# Patient Record
Sex: MALE | Race: WHITE | NOT HISPANIC OR LATINO
[De-identification: names, ages, dates, MRNs, and addresses within clinical notes are randomized per-mention and may not be internally consistent; named-entity substitution may affect disease eponyms.]

---

## 2021-12-29 ENCOUNTER — APPOINTMENT (OUTPATIENT)
Dept: ORTHOPEDIC SURGERY | Facility: CLINIC | Age: 51
End: 2021-12-29
Payer: MEDICAID

## 2021-12-29 VITALS
BODY MASS INDEX: 39.9 KG/M2 | DIASTOLIC BLOOD PRESSURE: 69 MMHG | WEIGHT: 285 LBS | HEART RATE: 66 BPM | SYSTOLIC BLOOD PRESSURE: 120 MMHG | HEIGHT: 71 IN

## 2021-12-29 PROCEDURE — 73564 X-RAY EXAM KNEE 4 OR MORE: CPT | Mod: RT

## 2021-12-29 PROCEDURE — 99203 OFFICE O/P NEW LOW 30 MIN: CPT

## 2021-12-29 RX ORDER — HYALURONATE SODIUM 20 MG/2 ML
20 SYRINGE (ML) INTRAARTICULAR
Qty: 1 | Refills: 0 | Status: ACTIVE | COMMUNITY
Start: 2021-12-29

## 2022-01-03 NOTE — DISCUSSION/SUMMARY
[de-identified] : 52 y/o male with right knee pain. \par \par Patient presents for evaluation of right knee pain.  Patient has been told that he has had meniscal issues in the past, and x-rays today showed degeneration of the medial compartment.  I discussed the treatment of degenerative arthritis with the patient at length today, as well as the chronic degenerative process and likely future progression of the disease. Pain may be related to the bony degenerative changes seen on XR imaging, or the associated degeneration to the soft tissues within the knee joint, including cartilage, meniscus, or ligamentous structures.  I described the spectrum of treatment options available including nonoperative modalities to total joint arthroplasty. Noninvasive and nonoperative treatment modalities include weight reduction, activity modification with low impact exercise, PRN use of acetaminophen or anti-inflammatory medication, natural anti-inflammatory supplements, glucosamine/chondroitin, and physical therapy (for strengthening and conditioning). More invasive treatments can include injection therapies; including cortisone, hyaluronic acid (visco-supplementation), or platelet-rich-plasma (PRP) injections. Definitive treatment could also include future total joint arthroplasty if symptoms persist and cause prolonged disability or interference with activities of daily living. \par \par The risks and benefits of each treatment option was discussed and all questions were answered. At this time recommendations are for conservative and symptomatic care as detailed above with impact loading activity restriction, low impact exercise and avoidance of strenuous activity. \par \par Other recommendations include OTC NSAIDs or acetaminophen (if tolerated/able), with application of ice to the knee 2-3x daily for 20 minutes or after periods of activity. \par \par Recommend trial of Visco supplementation injection therapy. We'll obtain preauthorization prior to injection. \par \par Followup: Once approved for HA injection therapy.

## 2022-01-03 NOTE — PHYSICAL EXAM
[de-identified] : Oriented to time, place, person\par Mood: Normal\par Affect: Normal\par Appearance: Healthy, well appearing, no acute distress.\par Gait: Normal\par Assistive Devices: None\par \par Right Knee Exam:\par \par Skin: Clean, dry, intact\par Inspection: Mild varus malalignment, no masses, no swelling, no effusion\par Pulses: 2+ DP/PT pulses \par ROM: 0-125 degrees of flexion. No pain with deep knee flexion/extension. Pain with valgus \par Tenderness: + MJLT. No LJLT. No pain over the patella facets. No pain to the quadriceps tendon. No pain to the patella tendon. No posterior knee tenderness.\par Stability: Stable to varus, valgus. Negative Lachman testing. Negative anterior drawer, negative posterior drawer.\par Strength: 5/5 Q/H/TA/GS/EHL, without atrophy\par Neuro: Intact to light touch throughout, DTRs normal\par Additional Tests: + Shashank's test, Negative patellar grind test  [de-identified] : The following radiographs were ordered and read by me during this patients visit. I reviewed each radiograph in detail with the patient and discussed the findings as highlighted below. \par \par 4 views of the right knee were obtained today, 12/29/2021, that show no acute fracture or dislocation. There is moderate medial, no lateral and mild patellofemoral degenerative changes seen. There is no significant malalignment. No significant other obvious osseous abnormality, otherwise unremarkable.

## 2022-01-03 NOTE — HISTORY OF PRESENT ILLNESS
[de-identified] : 51 year old male  presents today with right knee pain x 10 years. No injury reported. He recalls being told that he has torn meniscus treated with PT and cortisone injections which was minimally helpful. The pain has gotten worse in the past 2 months is constant worse with prolonged standing and pivoting. Naproxen is helpful for the past 2 weeks. Localizes pain to medial aspect of the knee. Reports occasional buckling and locking sensation. Denies catching, numbness or tingling. Patient states that he used to play football when he was younger. \par \par The patient's past medical history, past surgical history, medications and allergies were reviewed by me today with the patient and documented accordingly. In addition, the patient's family and social history, which were noncontributory to this visit, were reviewed also.

## 2022-01-03 NOTE — ADDENDUM
[FreeTextEntry1] : This note was written by Homa Mclean on 12/29/2021 acting solely as a scribe for Dr. Matthew Morales.\par \par All medical record entries made by the Scribe were at my, Dr. Matthew Morales, direction and personally dictated by me on 12/29/2021. I have personally reviewed the chart and agree that the record accurately reflects my personal performance of the history, physical exam, assessment and plan.

## 2022-01-11 ENCOUNTER — FORM ENCOUNTER (OUTPATIENT)
Age: 52
End: 2022-01-11

## 2022-01-24 ENCOUNTER — APPOINTMENT (OUTPATIENT)
Dept: UROLOGY | Facility: CLINIC | Age: 52
End: 2022-01-24
Payer: MEDICAID

## 2022-01-24 VITALS — DIASTOLIC BLOOD PRESSURE: 80 MMHG | RESPIRATION RATE: 16 BRPM | HEART RATE: 76 BPM | SYSTOLIC BLOOD PRESSURE: 138 MMHG

## 2022-01-24 DIAGNOSIS — Z78.9 OTHER SPECIFIED HEALTH STATUS: ICD-10-CM

## 2022-01-24 PROCEDURE — 99204 OFFICE O/P NEW MOD 45 MIN: CPT

## 2022-01-24 RX ORDER — NAPROXEN 500 MG/1
500 TABLET ORAL
Qty: 60 | Refills: 0 | Status: ACTIVE | COMMUNITY
Start: 2021-12-12

## 2022-01-24 RX ORDER — LIDOCAINE AND PRILOCAINE 25; 25 MG/G; MG/G
2.5-2.5 CREAM TOPICAL
Qty: 1 | Refills: 3 | Status: ACTIVE | COMMUNITY
Start: 2022-01-24 | End: 1900-01-01

## 2022-01-24 RX ORDER — KETOTIFEN FUMARATE 0.25 MG/ML
0.03 SOLUTION/ DROPS OPHTHALMIC
Qty: 5 | Refills: 0 | Status: ACTIVE | COMMUNITY
Start: 2021-12-12

## 2022-01-24 RX ORDER — FLUTICASONE PROPIONATE 50 UG/1
50 SPRAY, METERED NASAL
Qty: 16 | Refills: 0 | Status: ACTIVE | COMMUNITY
Start: 2021-12-12

## 2022-01-24 RX ORDER — ERGOCALCIFEROL 1.25 MG/1
1.25 MG CAPSULE, LIQUID FILLED ORAL
Qty: 4 | Refills: 0 | Status: ACTIVE | COMMUNITY
Start: 2021-12-12

## 2022-01-24 NOTE — LETTER BODY
[FreeTextEntry1] : Olga Chi MD \par [address], [address], \[place] [address]\par [phone]\par \par Dear Dr. Chi, \par \par Reason for Visit: Premature ejaculation. \par \par This is a 51 year-old English-speaking gentleman with premature ejaculation. Patient is referred for evaluation of his condition. The patient complains of premature ejaculation. His recent testosterone levels were slightly elevated, 699 ng/dL.   Patient denies any gross hematuria or dysuria or urinary incontinence. The patient denies any aggravating or relieving factors. The patient denies any interference of function. The patient is entirely asymptomatic. All other review of systems are negative. He has no cancer in his family medical history. He has no previous surgical history. Past medical history, family history and social history were inquired and were noncontributory to current condition. The patient does not use tobacco. He drinks alcohol. Medications and allergies were reviewed. He has no known allergies to medication. \par \par On examination, the patient is a healthy-appearing gentleman in no acute distress. He is alert and oriented follows commands. He has normal mood and affect. He is normocephalic. Oral no thrush. Neck is supple. Respirations are unlabored. His abdomen is soft and nontender. Liver is nonpalpable. Bladder is nonpalpable. No CVA tenderness. Neurologically he is grossly intact. No peripheral edema. Skin without gross abnormality. He has normal male external genitalia. Normal meatus. Bilateral testes are descended intrascrotally and normal to palpation. On rectal examination, there is normal sphincter tone. The prostate is clinically benign without focal induration or nodularity.\par \par His total testosterone was 699 ng/dL, which is slightly elevated. \par \par Assessment: Premature ejaculation. \par \par I counseled the patient. I discussed the various etiologies of his symptoms. Given his slightly elevated testosterone levels, I recommended the patient obtain a male hormone panel with testosterone, free testosterone, prolactin, and LH level.  He will also obtain PSA, BMP and urinalysis today to establish baselines. I recommended the patient begin a trial of Lidocaine cream to help avoid premature ejaculation. I discussed the potential side effects of the medication. I counseled the patient on its use and side effects. If the patient develops any side effects, the patient will discontinue the medication and contact me. I encouraged the patient to practice the Masters-Duane technique to help prevent premature ejaculation. Risks and alternatives were discussed. I answered the patient questions. The patient will follow-up as directed and will contact me with any questions or concerns. Thank you for the opportunity to participate in the care of this patient. I'll keep you updated on his progress.\par \par Plan: Lidocaine cream. Masters-Duane technique. PSA. BMP. Urinalysis. Male hormone panel. Follow up as directed.

## 2022-01-24 NOTE — HISTORY OF PRESENT ILLNESS
[FreeTextEntry1] : Please refer to [facility] Consult note \par \par new male pt \par premature ejaculation \par testosterone 699 \par repeat testosterone\par Masters Duane technique \par lidocaine cream

## 2022-01-24 NOTE — ADDENDUM
[FreeTextEntry1] : Entered by Eren Brakley, acting as scribe for Dr. Sohail Mehta.\par \par The documentation recorded by the scribe accurately reflects the service I personally performed and the decisions made by me.

## 2022-01-30 ENCOUNTER — FORM ENCOUNTER (OUTPATIENT)
Age: 52
End: 2022-01-30

## 2022-01-30 DIAGNOSIS — Z00.00 ENCOUNTER FOR GENERAL ADULT MEDICAL EXAMINATION W/OUT ABNORMAL FINDINGS: ICD-10-CM

## 2022-01-30 LAB
ANION GAP SERPL CALC-SCNC: 14 MMOL/L
BUN SERPL-MCNC: 22 MG/DL
CALCIUM SERPL-MCNC: 9.4 MG/DL
CHLORIDE SERPL-SCNC: 106 MMOL/L
CO2 SERPL-SCNC: 24 MMOL/L
CREAT SERPL-MCNC: 1.15 MG/DL
ESTRADIOL SERPL-MCNC: 35 PG/ML
GLUCOSE SERPL-MCNC: 99 MG/DL
LH SERPL-ACNC: 8.2 IU/L
POTASSIUM SERPL-SCNC: 4.7 MMOL/L
PROLACTIN SERPL-MCNC: 32.7 NG/ML
PSA FREE FLD-MCNC: 21 %
PSA FREE SERPL-MCNC: 0.26 NG/ML
PSA SERPL-MCNC: 1.2 NG/ML
SODIUM SERPL-SCNC: 144 MMOL/L
TESTOST FREE SERPL-MCNC: 12.9 PG/ML
TESTOST SERPL-MCNC: 799 NG/DL

## 2022-02-17 ENCOUNTER — APPOINTMENT (OUTPATIENT)
Dept: UROLOGY | Facility: CLINIC | Age: 52
End: 2022-02-17

## 2022-02-25 ENCOUNTER — OUTPATIENT (OUTPATIENT)
Dept: OUTPATIENT SERVICES | Facility: HOSPITAL | Age: 52
LOS: 1 days | End: 2022-02-25
Payer: MEDICAID

## 2022-02-25 ENCOUNTER — APPOINTMENT (OUTPATIENT)
Dept: MRI IMAGING | Facility: IMAGING CENTER | Age: 52
End: 2022-02-25
Payer: MEDICAID

## 2022-02-25 DIAGNOSIS — Z00.00 ENCOUNTER FOR GENERAL ADULT MEDICAL EXAMINATION WITHOUT ABNORMAL FINDINGS: ICD-10-CM

## 2022-02-25 PROCEDURE — 70553 MRI BRAIN STEM W/O & W/DYE: CPT | Mod: 26

## 2022-02-25 PROCEDURE — 70553 MRI BRAIN STEM W/O & W/DYE: CPT

## 2022-02-25 PROCEDURE — A9585: CPT

## 2022-03-08 ENCOUNTER — APPOINTMENT (OUTPATIENT)
Dept: PULMONOLOGY | Facility: CLINIC | Age: 52
End: 2022-03-08
Payer: MEDICAID

## 2022-03-08 ENCOUNTER — NON-APPOINTMENT (OUTPATIENT)
Age: 52
End: 2022-03-08

## 2022-03-08 VITALS
HEART RATE: 69 BPM | DIASTOLIC BLOOD PRESSURE: 70 MMHG | HEIGHT: 71 IN | WEIGHT: 285 LBS | SYSTOLIC BLOOD PRESSURE: 120 MMHG | RESPIRATION RATE: 17 BRPM | BODY MASS INDEX: 39.9 KG/M2 | TEMPERATURE: 97 F | OXYGEN SATURATION: 96 %

## 2022-03-08 DIAGNOSIS — Z87.891 PERSONAL HISTORY OF NICOTINE DEPENDENCE: ICD-10-CM

## 2022-03-08 DIAGNOSIS — Z82.5 FAMILY HISTORY OF ASTHMA AND OTHER CHRONIC LOWER RESPIRATORY DISEASES: ICD-10-CM

## 2022-03-08 DIAGNOSIS — Z72.89 OTHER PROBLEMS RELATED TO LIFESTYLE: ICD-10-CM

## 2022-03-08 DIAGNOSIS — Z87.39 PERSONAL HISTORY OF OTHER DISEASES OF THE MUSCULOSKELETAL SYSTEM AND CONNECTIVE TISSUE: ICD-10-CM

## 2022-03-08 DIAGNOSIS — A04.8 OTHER SPECIFIED BACTERIAL INTESTINAL INFECTIONS: ICD-10-CM

## 2022-03-08 DIAGNOSIS — Z83.3 FAMILY HISTORY OF DIABETES MELLITUS: ICD-10-CM

## 2022-03-08 DIAGNOSIS — M17.11 UNILATERAL PRIMARY OSTEOARTHRITIS, RIGHT KNEE: ICD-10-CM

## 2022-03-08 DIAGNOSIS — F52.4 PREMATURE EJACULATION: ICD-10-CM

## 2022-03-08 PROCEDURE — 95012 NITRIC OXIDE EXP GAS DETER: CPT

## 2022-03-08 PROCEDURE — 71046 X-RAY EXAM CHEST 2 VIEWS: CPT

## 2022-03-08 PROCEDURE — 94010 BREATHING CAPACITY TEST: CPT

## 2022-03-08 PROCEDURE — 99204 OFFICE O/P NEW MOD 45 MIN: CPT | Mod: 25

## 2022-03-08 PROCEDURE — 94618 PULMONARY STRESS TESTING: CPT

## 2022-03-08 RX ORDER — ALBUTEROL SULFATE 90 UG/1
108 (90 BASE) AEROSOL, METERED RESPIRATORY (INHALATION) EVERY 6 HOURS
Qty: 1 | Refills: 3 | Status: ACTIVE | COMMUNITY
Start: 2022-03-08 | End: 1900-01-01

## 2022-03-08 RX ORDER — OLOPATADINE HYDROCHLORIDE 665 UG/1
0.6 SPRAY, METERED NASAL
Qty: 3 | Refills: 1 | Status: ACTIVE | COMMUNITY
Start: 2022-03-08 | End: 1900-01-01

## 2022-03-08 RX ORDER — OMEPRAZOLE 20 MG/1
20 TABLET, DELAYED RELEASE ORAL
Refills: 0 | Status: ACTIVE | COMMUNITY

## 2022-03-08 RX ORDER — BUDESONIDE AND FORMOTEROL FUMARATE DIHYDRATE 160; 4.5 UG/1; UG/1
160-4.5 AEROSOL RESPIRATORY (INHALATION) TWICE DAILY
Qty: 3 | Refills: 1 | Status: ACTIVE | COMMUNITY
Start: 2022-03-08 | End: 1900-01-01

## 2022-03-08 NOTE — ADDENDUM
[FreeTextEntry1] : Documented by Luis Alberto Lerner acting as a scribe for Dr. Robert Keller on  (03/08/2022).\par \par All medical record entries made by the Scribe were at my, Dr. Robert Keller's, direction and personally dictated by me on  (03/08/2022). I have reviewed the chart and agree that the record accurately reflects my personal performance of the history, physical exam, assessment and plan. I have also personally directed, reviewed, and agree with the discharge instructions. \par

## 2022-03-08 NOTE — ASSESSMENT
[FreeTextEntry1] : Mr. HO is a 51 y.o  male with a history of osteoarthritis right knee, overweight, GERD, premature ejaculation, recently elevated prolactin level  who presents into the office for an initial pulmonary evaluation for SOB, ?MAUDE, ?asthma/allergies, GERD \par \par \par The patient's SOB is felt to be multifactorial:\par -poor mechanics of breathing\par -out of shape/overweight\par -Pulmonary\par     - Asthma \par     -Allergies\par     -GERD \par     -?MAUDE\par -Cardiac\par \par Problem 1: Likely Asthma (risk factors: allergies and reflux)\par -add Symbicort 160 2 inhalations BID \par -add Ventolin 2 puffs Q6H, pre-exercise as needed\par - Inhaler technique reviewed as well as oral hygiene technique reviewed with patient. Avoidance of cold air, extremes of temperature, rescue inhaler should be used before exercise. Order of medication reviewed with patient. Recommended use of a cool mist humidifier in the bedroom. \par - Asthma is believed to be caused by inherited (genetic) and environmental factor, but its exact cause is unknown. asthma may be triggered by allergens, lung infections, or irritants in the air. Asthma triggers are different for each person \par \par Problem 2: Allergy and Sinus \par -add Olopatadine 0.6% 1 sniff BID\par  -get Blood work to include: asthma panel, food IgE panel, IgE level, eosinophil level, vitamin D level\par -Environmental measures for allergies were encouraged including mattress and pillow cover, air purifier, and environmental controls. \par  \par \par Problem 3: GERD \par -add Omeprazole QAM 30 minutes after other medications and 30 minutes before morning meal.\par -Rule of 2s: avoid eating too much, eating too late, eating too spicy, eating two hours before bed.\par - Things to avoid including overeating, spicy foods, tight clothing, eating within two hours of bed, this list is not all inclusive.\par - For treatments of reflux, possible options discussed including diet control, H2 blockers, PPIs, as well as coating motility agents discussed as treatment options. Timing of meals and proximity of last meal to sleep were discussed. If symptoms persist, a formal gastrointestinal evaluation is needed. \par \par Problem 4: Rull- out MAUDE ( risk factor: elevated Mallampati class, neck size-19)\par -complete home sleep study \par -Sleep apnea is associated with adverse clinical consequences which can affect most organ systems. Cardiovascular disease risk includes arrhythmias, atrial fibrillation, hypertension, coronary artery disease, and stroke. Metabolic disorders include diabetes type 2, non-alcoholic fatty liver disease. Mood disorder especially depression; and cognitive decline especially in the elderly. Associations with chronic reflux/Grimes’s esophagus some but not all inclusive. \par -Reasons include arousal consistent with hypopnea; respiratory events most prominent in REM sleep or supine position; therefore sleep staging and body position are important for accurate diagnosis and estimation of AHI. \par  \par \par Problem 5: Low Vitamin D \par -Has been associated with asthma exacerbations and increased allergic symptoms. The goal based on recent information is maintaining levels between 50-70 and low normal is 30. Recommended 50,000 units every two weeks to once a month depending on the level. \par \par Problem 6:Cardiac\par -Recommend cardiac follow up evaluation with cardiologist if needed \par \par Problem 7:overweight/out of shape\par - Weight loss, exercise and diet control were discussed and are highly encouraged. Treatment options were given such as aqua therapy, and contacting a nutritionist. Recommended to use the elliptical, stationary bike, less use of treadmill. Mindful eating was explained to the patient. Obesity is associated with worsening asthma, SOB, and potential for cardiac disease, diabetes, and other underlying medical conditions.\par \par Problem 8: Poor mechanics of breathing\par - Proper breathing techniques were reviewed with an emphasis on exhalation. Patient instructed to breath in for 1 second and out for four seconds. Patient was encouraged not to talk while walking.\par \par Problem 9: Health Maintenance\par -s/p flu shot\par -recommended strep pneumonia vaccines: Prevnar-13 vaccine, follow by Pneumo vaccine 23 one year following\par -recommended early intervention for URIs\par -recommended regular osteoporosis evaluations\par -recommended early dermatological evaluations\par -recommended after the age of 50 to the age of 70, colonoscopy every 5 years\par \par f/u in 6-8 weeks\par pt is encouraged to call or fax the office with any questions or concerns.

## 2022-03-08 NOTE — PROCEDURE
[FreeTextEntry1] : PFT revealed mild restriction, with a FEV1 of 3.20L, which is 78% of predicted, with a poor inspiratory limb \par  \par Feno was 87; a normal value being less than 25. Fractional exhaled nitric oxide (FENO) is regarded as a simple, noninvasive method for assessing eosinophilic airway inflammation. Produced by a variety of cells within the lung, nitric oxide (NO) concentrations are generally low in healthy individuals. However, high concentrations of NO appear to be involved in nonspecific host defense mechanisms and chronic inflammatory  diseases such as asthma. The American Thoracic Society (ATS) therefore recommended using FENO to aid in the diagnosis and monitoring of eosinophilic airway inflammation and asthma, and for identifying steroid responsive individuals whose chronic respiratory symptoms may be caused by airway inflammation \par \par 6 minute walk test reveals a low saturation of 96% with no evidence of dyspnea or fatigue; walked 489 meters\par  \par \par CXR revealed a normal sized heart; there was no evidence of infiltrate or effusion -- A normal appearing chest radiograph \par \par Free and total testosterone (01/24/2022)- 799, prolactin level- 39

## 2022-03-08 NOTE — HISTORY OF PRESENT ILLNESS
[TextBox_4] : Mr. HO is a 51 year male with a history of osteoarthritis right knee, overweight, GERD, premature ejaculation, recently elevated prolactin level who now comes in for an initial pulmonary evaluation. His chief complaint is\par - he notes getting SOB occasionally now \par - he notes wheezing for last 2 years \par - he notes gaining 20 lbs over last 2 months \par - he notes cold air makes him feel better\par - he notes appetite is good\par - he notes snoring \par - he notes waking up atleast once to use restroom every night\par - he reports being able to fall asleep while watching a boring TV show \par -he reports being able to fall asleep as a passenger in a car for over an hour\par - he notes concentration and memory is good \par - he notes neck size is 19 \par -he notes his bowels are regular \par - he notes sense of smell and taste is good \par - he notes sinuses are quite\par - he notes having very  bad allergies \par - he notes allergy symptoms are sneezing, wheezing and PND (spring is worse)\par - he notes reflux \par - he notes muscle aches and pains \par \par patient denies any headaches, nausea, vomiting, fever, chills, sweats, chest pain, chest pressure, palpitations, coughing, fatigue, diarrhea, constipation, dysphagia, myalgias, dizziness, leg swelling, leg pain, itchy eyes, itchy ears, heartburn, or sour taste in the mouth

## 2022-03-13 ENCOUNTER — LABORATORY RESULT (OUTPATIENT)
Age: 52
End: 2022-03-13

## 2022-03-14 LAB
24R-OH-CALCIDIOL SERPL-MCNC: 73.3 PG/ML
25(OH)D3 SERPL-MCNC: 20.6 NG/ML
BASOPHILS # BLD AUTO: 0.04 K/UL
BASOPHILS NFR BLD AUTO: 0.7 %
EOSINOPHIL # BLD AUTO: 0.19 K/UL
EOSINOPHIL NFR BLD AUTO: 3.5 %
HCT VFR BLD CALC: 46.1 %
HGB BLD-MCNC: 15 G/DL
IMM GRANULOCYTES NFR BLD AUTO: 0.4 %
LYMPHOCYTES # BLD AUTO: 2.23 K/UL
LYMPHOCYTES NFR BLD AUTO: 41.4 %
MAN DIFF?: NORMAL
MCHC RBC-ENTMCNC: 32.5 GM/DL
MCHC RBC-ENTMCNC: 32.8 PG
MCV RBC AUTO: 100.9 FL
MONOCYTES # BLD AUTO: 0.63 K/UL
MONOCYTES NFR BLD AUTO: 11.7 %
NEUTROPHILS # BLD AUTO: 2.28 K/UL
NEUTROPHILS NFR BLD AUTO: 42.3 %
PLATELET # BLD AUTO: 218 K/UL
RBC # BLD: 4.57 M/UL
RBC # FLD: 13.1 %
WBC # FLD AUTO: 5.39 K/UL

## 2022-03-17 LAB
A ALTERNATA IGE QN: <0.1 KUA/L
A ALTERNATA IGE QN: <0.1 KUA/L
A FUMIGATUS IGE QN: <0.1 KUA/L
A FUMIGATUS IGE QN: <0.1 KUA/L
BERMUDA GRASS IGE QN: <0.1 KUA/L
BOXELDER IGE QN: 0.17 KUA/L
C ALBICANS IGE QN: <0.1 KUA/L
C HERBARUM IGE QN: <0.1 KUA/L
C HERBARUM IGE QN: <0.1 KUA/L
CALIF WALNUT IGE QN: <0.1 KUA/L
CAT DANDER IGE QN: 0.43 KUA/L
CAT DANDER IGE QN: 0.43 KUA/L
CLAM IGE QN: <0.1 KUA/L
CMN PIGWEED IGE QN: <0.1 KUA/L
CODFISH IGE QN: <0.1 KUA/L
COMMON RAGWEED IGE QN: 0.28 KUA/L
COMMON RAGWEED IGE QN: 0.28 KUA/L
CORN IGE QN: <0.1 KUA/L
COTTONWOOD IGE QN: <0.1 KUA/L
COW MILK IGE QN: <0.1 KUA/L
D FARINAE IGE QN: 0.19 KUA/L
D FARINAE IGE QN: 0.19 KUA/L
D PTERONYSS IGE QN: 0.12 KUA/L
D PTERONYSS IGE QN: 0.12 KUA/L
DEPRECATED A ALTERNATA IGE RAST QL: 0
DEPRECATED A ALTERNATA IGE RAST QL: 0
DEPRECATED A FUMIGATUS IGE RAST QL: 0
DEPRECATED A FUMIGATUS IGE RAST QL: 0
DEPRECATED BERMUDA GRASS IGE RAST QL: 0
DEPRECATED BOXELDER IGE RAST QL: NORMAL
DEPRECATED C ALBICANS IGE RAST QL: 0
DEPRECATED C HERBARUM IGE RAST QL: 0
DEPRECATED C HERBARUM IGE RAST QL: 0
DEPRECATED CAT DANDER IGE RAST QL: 1
DEPRECATED CAT DANDER IGE RAST QL: 1
DEPRECATED CLAM IGE RAST QL: 0
DEPRECATED CODFISH IGE RAST QL: 0
DEPRECATED COMMON PIGWEED IGE RAST QL: 0
DEPRECATED COMMON RAGWEED IGE RAST QL: NORMAL
DEPRECATED COMMON RAGWEED IGE RAST QL: NORMAL
DEPRECATED CORN IGE RAST QL: 0
DEPRECATED COTTONWOOD IGE RAST QL: 0
DEPRECATED COW MILK IGE RAST QL: 0
DEPRECATED D FARINAE IGE RAST QL: NORMAL
DEPRECATED D FARINAE IGE RAST QL: NORMAL
DEPRECATED D PTERONYSS IGE RAST QL: NORMAL
DEPRECATED D PTERONYSS IGE RAST QL: NORMAL
DEPRECATED DOG DANDER IGE RAST QL: 1
DEPRECATED DOG DANDER IGE RAST QL: 1
DEPRECATED DUCK FEATHER IGE RAST QL: 0
DEPRECATED EGG WHITE IGE RAST QL: 0
DEPRECATED GOOSE FEATHER IGE RAST QL: 0
DEPRECATED GOOSEFOOT IGE RAST QL: 0
DEPRECATED LONDON PLANE IGE RAST QL: 0
DEPRECATED M RACEMOSUS IGE RAST QL: 0
DEPRECATED MOUSE URINE PROT IGE RAST QL: NORMAL
DEPRECATED MUGWORT IGE RAST QL: 0
DEPRECATED P NOTATUM IGE RAST QL: 0
DEPRECATED PEANUT IGE RAST QL: 0
DEPRECATED RED CEDAR IGE RAST QL: 0
DEPRECATED ROACH IGE RAST QL: 2
DEPRECATED ROACH IGE RAST QL: 2
DEPRECATED SCALLOP IGE RAST QL: <0.1 KUA/L
DEPRECATED SESAME SEED IGE RAST QL: 0
DEPRECATED SHEEP SORREL IGE RAST QL: 0
DEPRECATED SHRIMP IGE RAST QL: 3
DEPRECATED SILVER BIRCH IGE RAST QL: 0
DEPRECATED SOYBEAN IGE RAST QL: 0
DEPRECATED TIMOTHY IGE RAST QL: 0
DEPRECATED TIMOTHY IGE RAST QL: 0
DEPRECATED WALNUT IGE RAST QL: 0
DEPRECATED WHEAT IGE RAST QL: 0
DEPRECATED WHITE ASH IGE RAST QL: 0
DEPRECATED WHITE OAK IGE RAST QL: 0
DEPRECATED WHITE OAK IGE RAST QL: 0
DOG DANDER IGE QN: 0.49 KUA/L
DOG DANDER IGE QN: 0.49 KUA/L
DUCK FEATHER IGE QN: <0.1 KUA/L
EGG WHITE IGE QN: <0.1 KUA/L
GOOSE FEATHER IGE QN: <0.1 KUA/L
GOOSEFOOT IGE QN: <0.1 KUA/L
LONDON PLANE IGE QN: <0.1 KUA/L
M RACEMOSUS IGE QN: <0.1 KUA/L
MOUSE URINE PROT IGE QN: 0.31 KUA/L
MUGWORT IGE QN: <0.1 KUA/L
MULBERRY (T70) CLASS: 0
MULBERRY (T70) CONC: <0.1 KUA/L
P NOTATUM IGE QN: <0.1 KUA/L
PEANUT IGE QN: <0.1 KUA/L
RED CEDAR IGE QN: <0.1 KUA/L
ROACH IGE QN: 1.88 KUA/L
ROACH IGE QN: 1.88 KUA/L
SCALLOP IGE QN: 0
SCALLOP IGE QN: 8.88 KUA/L
SESAME SEED IGE QN: <0.1 KUA/L
SHEEP SORREL IGE QN: <0.1 KUA/L
SILVER BIRCH IGE QN: <0.1 KUA/L
SOYBEAN IGE QN: <0.1 KUA/L
TIMOTHY IGE QN: <0.1 KUA/L
TIMOTHY IGE QN: <0.1 KUA/L
TOTAL IGE SMQN RAST: 199 KU/L
TREE ALLERG MIX1 IGE QL: 0
WALNUT IGE QN: <0.1 KUA/L
WHEAT IGE QN: <0.1 KUA/L
WHITE ASH IGE QN: <0.1 KUA/L
WHITE ELM IGE QN: 0
WHITE ELM IGE QN: <0.1 KUA/L
WHITE OAK IGE QN: <0.1 KUA/L
WHITE OAK IGE QN: <0.1 KUA/L

## 2022-03-24 ENCOUNTER — NON-APPOINTMENT (OUTPATIENT)
Age: 52
End: 2022-03-24

## 2022-04-25 ENCOUNTER — APPOINTMENT (OUTPATIENT)
Dept: ENDOCRINOLOGY | Facility: CLINIC | Age: 52
End: 2022-04-25
Payer: MEDICAID

## 2022-04-25 VITALS
BODY MASS INDEX: 42.98 KG/M2 | HEART RATE: 71 BPM | WEIGHT: 307 LBS | SYSTOLIC BLOOD PRESSURE: 130 MMHG | OXYGEN SATURATION: 95 % | HEIGHT: 71 IN | DIASTOLIC BLOOD PRESSURE: 78 MMHG

## 2022-04-25 DIAGNOSIS — R79.89 OTHER SPECIFIED ABNORMAL FINDINGS OF BLOOD CHEMISTRY: ICD-10-CM

## 2022-04-25 PROCEDURE — 99204 OFFICE O/P NEW MOD 45 MIN: CPT

## 2022-04-30 NOTE — HISTORY OF PRESENT ILLNESS
[FreeTextEntry1] : MADDI HO  is a 50 yo male  with past medical history of OA of right knee, GERD, premature ejaculation, who presents as referred by urology for management of elevated prolactin.\par \par Patient notes he was seen by urology in Jan 2022, noted to have mildly elevated prolactin level upon review of bloodwork. Noted also elevated testosterone levels. MRI pituitary in Feb 2022 was negative for any microadenoma. Patient notes weight gain of about 20lbs in last couple of months, notes lack of energy, feeling like he has hot flashes, sweating. Denies blurry or double vision, galactorrhea. Has decrease in libido and headaches. . \par Patient denies dyspnea, dysphagia, dysphonia, changes in bowel habits.\par \par Family Hx: Mom- DM, daughter- type 1 DM. No known h/o thyroid disease\par Social Hx: social ETOH, denies illicit drug use or tobacco\par ALL: probiotics

## 2022-04-30 NOTE — PHYSICAL EXAM
[Alert] : alert [Obese] : obese [No Acute Distress] : no acute distress [Well Developed] : well developed [Normal Sclera/Conjunctiva] : normal sclera/conjunctiva [EOMI] : extra ocular movement intact [No Proptosis] : no proptosis [No Lid Lag] : no lid lag [Normal Hearing] : hearing was normal [No LAD] : no lymphadenopathy [Supple] : the neck was supple [Thyroid Not Enlarged] : the thyroid was not enlarged [No Thyroid Nodules] : no palpable thyroid nodules [No Respiratory Distress] : no respiratory distress [No Accessory Muscle Use] : no accessory muscle use [Clear to Auscultation] : lungs were clear to auscultation bilaterally [Normal Rate and Effort] : normal respiratory rate and effort [Normal S1, S2] : normal S1 and S2 [No Murmurs] : no murmurs [Normal Rate] : heart rate was normal [Regular Rhythm] : with a regular rhythm [Normal Bowel Sounds] : normal bowel sounds [Not Tender] : non-tender [Soft] : abdomen soft [Normal Gait] : normal gait [No Clubbing, Cyanosis] : no clubbing  or cyanosis of the fingernails [No Involuntary Movements] : no involuntary movements were seen [Abdominal Striae] : abdominal striae present [Acanthosis Nigricans] : no acanthosis nigricans [No Tremors] : no tremors [Oriented x3] : oriented to person, place, and time [Normal Insight/Judgement] : insight and judgment were intact

## 2022-04-30 NOTE — ASSESSMENT
[FreeTextEntry1] : Patient presents for mildly elevated prolactin level, unclear etiology.\par Not currently on medications that may be be contributing to prolactin. Reviewed recent bloodwork, noted LH/FSH levels are normal, not indicative of hypogonadism. No TFTs to review.\par Noted MRI of pituitary is negative for pituitary adenoma.\par Explained different causes of elevated prolactin including emotional or physical stressors, prolactin levels may also mildly increase with meals, if not collected as fasting. Also discussed avoiding any form of nipple stimulation during sexual intercourse before checking serum prolactin levels with patient and his wife today.\par Advised to do all hormonal bloodwork as fasting, will check for TFTs and macroprolactin and follow up accordingly.\par \par Answered all questions today; patient and his wife verbalized understanding of the above\par RTC as needed.

## 2022-05-02 LAB
FSH SERPL-MCNC: 6.7 IU/L
LH SERPL-ACNC: 6.1 IU/L
T4 FREE SERPL-MCNC: 1 NG/DL
TSH SERPL-ACNC: 1.25 UIU/ML

## 2022-05-10 ENCOUNTER — APPOINTMENT (OUTPATIENT)
Dept: SLEEP CENTER | Facility: CLINIC | Age: 52
End: 2022-05-10

## 2022-05-11 LAB
TESTOST FREE SERPL-MCNC: 14 PG/ML
TESTOST SERPL-MCNC: 906 NG/DL

## 2022-05-12 LAB
MONOMERIC PROLACTIN (ICMA)*: 7.2 NG/ML
PERCENT MACROPROLACTIN: 78 %
PROLACTIN, SERUM (ICMA)*: 33.1 NG/ML

## 2022-05-13 ENCOUNTER — APPOINTMENT (OUTPATIENT)
Dept: PULMONOLOGY | Facility: CLINIC | Age: 52
End: 2022-05-13
Payer: MEDICAID

## 2022-05-13 ENCOUNTER — NON-APPOINTMENT (OUTPATIENT)
Age: 52
End: 2022-05-13

## 2022-05-13 DIAGNOSIS — J45.909 UNSPECIFIED ASTHMA, UNCOMPLICATED: ICD-10-CM

## 2022-05-13 DIAGNOSIS — R76.8 OTHER SPECIFIED ABNORMAL IMMUNOLOGICAL FINDINGS IN SERUM: ICD-10-CM

## 2022-05-13 DIAGNOSIS — R06.02 SHORTNESS OF BREATH: ICD-10-CM

## 2022-05-13 DIAGNOSIS — R79.89 OTHER SPECIFIED ABNORMAL FINDINGS OF BLOOD CHEMISTRY: ICD-10-CM

## 2022-05-13 DIAGNOSIS — E66.3 OVERWEIGHT: ICD-10-CM

## 2022-05-13 DIAGNOSIS — K21.9 GASTRO-ESOPHAGEAL REFLUX DISEASE W/OUT ESOPHAGITIS: ICD-10-CM

## 2022-05-13 DIAGNOSIS — R06.83 SNORING: ICD-10-CM

## 2022-05-13 DIAGNOSIS — J30.9 ALLERGIC RHINITIS, UNSPECIFIED: ICD-10-CM

## 2022-05-13 PROCEDURE — 94010 BREATHING CAPACITY TEST: CPT

## 2022-05-13 PROCEDURE — 99214 OFFICE O/P EST MOD 30 MIN: CPT | Mod: 25

## 2022-05-13 PROCEDURE — 95012 NITRIC OXIDE EXP GAS DETER: CPT

## 2022-05-13 NOTE — PROCEDURE
[FreeTextEntry1] : Feno was 28; a normal value being less than 25. Fractional exhaled nitric oxide (FENO) is regarded as a simple, noninvasive method for assessing eosinophilic airway inflammation. Produced by a variety of cells within the lung, nitric oxide (NO) concentrations are generally low in healthy individuals. However, high concentrations of NO appear to be involved in nonspecific host defense mechanisms and chronic inflammatory  diseases such as asthma. The American Thoracic Society (ATS) therefore recommended using FENO to aid in the diagnosis and monitoring of eosinophilic airway inflammation and asthma, and for identifying steroid responsive individuals whose chronic respiratory symptoms may be caused by airway inflammation \par \par PFT revealed normal flows, with a FEV1 of 3.02L, which is 74% of predicted, with a flattened inspiratory limb\par \par Bloodwork revealed low vitmain D levels, normal eosinophil levels, allergies to cockroaches and shellfish, elevated IgE levels

## 2022-05-13 NOTE — ADDENDUM
[FreeTextEntry1] : Documented by Olman Beltran acting as a scribe for Dr. Robert Keller on 05/13/2022.\par \par All medical record entries made by the Scribe were at my, Dr. Robert Keller's, direction and personally dictated by me on 05/13/2022. I have reviewed the chart and agree that the record accurately reflects my personal performance of the history, physical exam, assessment and plan. I have also personally directed, reviewed, and agree with the discharge instructions.

## 2022-05-13 NOTE — ASSESSMENT
[FreeTextEntry1] : Mr. HO is a 51 y.o  male with a history of osteoarthritis right knee, overweight, GERD, premature ejaculation, recently elevated prolactin level  who presents into the office for a follow-up evaluation for SOB, ?MAUDE, ?asthma/allergies, GERD, elevated IgE levels -stable\par \par \par The patient's SOB is felt to be multifactorial:\par -poor mechanics of breathing\par -out of shape/overweight\par -Pulmonary\par     - Asthma \par     -Allergies\par     -GERD \par     -?MAUDE\par -Cardiac\par \par Problem 1: Likely Asthma (risk factors: allergies and reflux)\par -add Symbicort 160 2 inhalations BID \par -add Ventolin 2 puffs Q6H, pre-exercise as needed\par - Inhaler technique reviewed as well as oral hygiene technique reviewed with patient. Avoidance of cold air, extremes of temperature, rescue inhaler should be used before exercise. Order of medication reviewed with patient. Recommended use of a cool mist humidifier in the bedroom. \par - Asthma is believed to be caused by inherited (genetic) and environmental factor, but its exact cause is unknown. asthma may be triggered by allergens, lung infections, or irritants in the air. Asthma triggers are different for each person \par \par Problem 1A: Elevated IgE levels\par -Xolair is a recombinant DNA- derived humanized IgG1K monoclonal antibody that selectively binds ot human immunoglobulin E (IgE). Xolair is produced by a Chinese hamster ovary cell suspension culture in nutrient medium containing the antibiotic gentamicin. Gentamicin is not detectable in the final product. Xolair is a sterile, white, preservative free, lyophilized powder contained in a single use vial that is reconstituted with sterile water for suspension. Side effects include: wheezing, tightness of the chest, trouble breathing, hives, skin rash, feeling anxious or light-headed, fainting, warmth or tingling under skin, or swelling of face, lips, or tongue \par \par Problem 2: Allergy and Sinus \par -add Olopatadine 0.6% 1 sniff BID\par  -s/p Blood work to include: asthma panel (+), food IgE panel (+), IgE level (+), eosinophil level (-), low vitamin D level\par -Environmental measures for allergies were encouraged including mattress and pillow cover, air purifier, and environmental controls. \par \par Problem 2A: Low vitamin D\par -continue supplement\par  \par \par Problem 3: GERD \par -add Omeprazole QAM 30 minutes after other medications and 30 minutes before morning meal.\par -Rule of 2s: avoid eating too much, eating too late, eating too spicy, eating two hours before bed.\par - Things to avoid including overeating, spicy foods, tight clothing, eating within two hours of bed, this list is not all inclusive.\par - For treatments of reflux, possible options discussed including diet control, H2 blockers, PPIs, as well as coating motility agents discussed as treatment options. Timing of meals and proximity of last meal to sleep were discussed. If symptoms persist, a formal gastrointestinal evaluation is needed. \par \par Problem 4: Rull- out MAUDE ( risk factor: elevated Mallampati class, neck size-19)\par -complete home sleep study (in lab)\par -Sleep apnea is associated with adverse clinical consequences which can affect most organ systems. Cardiovascular disease risk includes arrhythmias, atrial fibrillation, hypertension, coronary artery disease, and stroke. Metabolic disorders include diabetes type 2, non-alcoholic fatty liver disease. Mood disorder especially depression; and cognitive decline especially in the elderly. Associations with chronic reflux/Grimes’s esophagus some but not all inclusive. \par -Reasons include arousal consistent with hypopnea; respiratory events most prominent in REM sleep or supine position; therefore sleep staging and body position are important for accurate diagnosis and estimation of AHI. \par  \par \par Problem 5: Low Vitamin D \par -Has been associated with asthma exacerbations and increased allergic symptoms. The goal based on recent information is maintaining levels between 50-70 and low normal is 30. Recommended 50,000 units every two weeks to once a month depending on the level. \par \par Problem 6:Cardiac\par -Recommend cardiac follow up evaluation with cardiologist if needed \par \par Problem 7:overweight/out of shape\par - Weight loss, exercise and diet control were discussed and are highly encouraged. Treatment options were given such as aqua therapy, and contacting a nutritionist. Recommended to use the elliptical, stationary bike, less use of treadmill. Mindful eating was explained to the patient. Obesity is associated with worsening asthma, SOB, and potential for cardiac disease, diabetes, and other underlying medical conditions.\par \par Problem 8: Poor mechanics of breathing\par -Recommended Wim Hof and Buteyko breathing techniques \par - Proper breathing techniques were reviewed with an emphasis on exhalation. Patient instructed to breath in for 1 second and out for four seconds. Patient was encouraged not to talk while walking.\par \par Problem 9: Health Maintenance\par -s/p covid 19 vaccine x2\par -s/p flu shot\par -recommended strep pneumonia vaccines: Prevnar-13 vaccine, follow by Pneumo vaccine 23 one year following\par -recommended early intervention for URIs\par -recommended regular osteoporosis evaluations\par -recommended early dermatological evaluations\par -recommended after the age of 50 to the age of 70, colonoscopy every 5 years\par \par f/u in 6-8 weeks\par pt is encouraged to call or fax the office with any questions or concerns.

## 2022-09-19 ENCOUNTER — APPOINTMENT (OUTPATIENT)
Dept: PULMONOLOGY | Facility: CLINIC | Age: 52
End: 2022-09-19

## 2023-04-10 ENCOUNTER — APPOINTMENT (OUTPATIENT)
Dept: ORTHOPEDIC SURGERY | Facility: CLINIC | Age: 53
End: 2023-04-10
Payer: MEDICAID

## 2023-04-10 VITALS
HEART RATE: 69 BPM | DIASTOLIC BLOOD PRESSURE: 76 MMHG | BODY MASS INDEX: 40.88 KG/M2 | SYSTOLIC BLOOD PRESSURE: 132 MMHG | TEMPERATURE: 97.2 F | OXYGEN SATURATION: 97 % | HEIGHT: 71 IN | WEIGHT: 292 LBS

## 2023-04-10 DIAGNOSIS — M25.562 PAIN IN LEFT KNEE: ICD-10-CM

## 2023-04-10 PROCEDURE — 20610 DRAIN/INJ JOINT/BURSA W/O US: CPT | Mod: LT

## 2023-04-10 PROCEDURE — 99214 OFFICE O/P EST MOD 30 MIN: CPT | Mod: 25

## 2023-04-10 PROCEDURE — 73564 X-RAY EXAM KNEE 4 OR MORE: CPT | Mod: LT

## 2023-04-10 NOTE — PHYSICAL EXAM
[LE] : Sensory: Intact in bilateral lower extremities [DP] : dorsalis pedis 2+ and symmetric bilaterally [PT] : posterior tibial 2+ and symmetric bilaterally [Normal] : Alert and in no acute distress [Poor Appearance] : well-appearing [Acute Distress] : not in acute distress [de-identified] : The patient has no respiratory distress. Mood and affect are normal. The patient is alert and oriented to person, place and time.\par There is no pain with active or passive motion of the hips.  There is no tenderness of either hip.  Examination of the knees demonstrates medial sided tenderness of the left knee.  Quadriceps and hamstring function are intact.  There is no instability of collateral or cruciate ligaments.  Range of motion 0 to 100 degrees left, 0 to 115 degrees right.  The calves are soft and nontender.  The skin is intact.  There is no lymphedema. [de-identified] : AP, lateral, tunnel and sunrise x-rays of the left knee taken today demonstrate no fracture or dislocation.  There are degenerative changes.  The single AP view of the right knee demonstrates medial compartment osteoarthritis.

## 2023-04-10 NOTE — DISCUSSION/SUMMARY
[de-identified] : The patient has mild arthritis of the left knee.  He has had an exacerbation.  I have discussed the pathology, natural history and treatment options with him.  He is started on a course of naproxen.  Medication risks have been reviewed.  He is given in an intra-articular steroid injection.\par Informed consent was obtained. Site and procedure were confirmed with the patient. Following a sterile prep the left knee was aspirated but no fluid was returned. 1 cc of Depo-Medrol and 4 cc of 1% Xylocaine was injected in the left knee without complication. Sterile dressing was applied. Instructions were given.\par If not improved in 3 weeks he should return.

## 2023-04-10 NOTE — HISTORY OF PRESENT ILLNESS
[de-identified] : 52 year old male  presents for initial evaluation of left knee pain x 3 days. Denies injury, reports that on 4/8 while standing at work he began experiencing sharp pain in the knee. He complains of sharp pain over the medial aspect of the left knee worse with any prolonged walking, standing and climbing stairs. He has been taking Tylenol and wearing a knee brace for support. Denies mechanical symptoms of locking, buckling or giving away. He reports a similar episode in the right knee about 2 years ago for which he saw Dr. Morales. He is interested in discussing injection therapy today.

## 2023-05-02 RX ORDER — NAPROXEN SODIUM 550 MG/1
550 TABLET ORAL
Qty: 60 | Refills: 0 | Status: ACTIVE | COMMUNITY
Start: 2023-05-02 | End: 1900-01-01

## 2023-05-10 ENCOUNTER — APPOINTMENT (OUTPATIENT)
Dept: ORTHOPEDIC SURGERY | Facility: CLINIC | Age: 53
End: 2023-05-10
Payer: MEDICAID

## 2023-05-10 DIAGNOSIS — M79.89 OTHER SPECIFIED SOFT TISSUE DISORDERS: ICD-10-CM

## 2023-05-10 PROCEDURE — 73630 X-RAY EXAM OF FOOT: CPT | Mod: LT

## 2023-05-10 PROCEDURE — 99213 OFFICE O/P EST LOW 20 MIN: CPT

## 2023-05-11 PROBLEM — M79.89 FOOT SWELLING: Status: ACTIVE | Noted: 2023-05-11

## 2023-05-11 NOTE — HISTORY OF PRESENT ILLNESS
[de-identified] : 52 year old male presents today with left foot swelling since 5/7/23 .No injury reported. Patient woke up with a swollen foot. The swelling has subsided since Sunday, but does have some continued residual discomfort through the lateral ankle. He was seen in April 2023 for left knee pain by DR. Palacios and received a steroid injection. PMHX: Flat foot

## 2023-05-11 NOTE — DISCUSSION/SUMMARY
[de-identified] : 52 year old male with left foot pes planus, acute swelling.\par \par Patient presents for evaluation of acute onset left foot swelling.  Denies trauma or any injury or increased activity.  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  There is no discernible pain or internal derangement suspected, and radiographs are normal.  Would recommend trial of conservative management at this time without further imaging.  Symptoms appear to be resolving, and we discussed that treatment should be directed towards reducing swelling including activity modification, ice and elevation.\par \par Recommendation: Activity modification, NSAIDs/tylenol/ice p.r.n. elevation as needed.\par \par Follow-up if swelling persists/worsens.

## 2023-05-11 NOTE — ADDENDUM
[FreeTextEntry1] : I, Ghanshyam Vega, acted solely as a scribe for Dr. Matthew Morales MD on  05/10/2023.\par \par All medical record entries made by the Scribe were at my, Dr. Matthew Morales MD, direction and personally dictated by me on 05/10/2023. I have personally reviewed the chart and agree that the record accurately reflects my personal performance of the history, physical exam, assessment and plan.

## 2023-05-11 NOTE — PHYSICAL EXAM
[de-identified] : Oriented to time, place, person\par Mood: Normal\par Affect: Normal\par Appearance: Healthy, well appearing, no acute distress.\par Gait: Normal\par Assistive Devices: None \par \par Left foot/ ankle exam:\par \par Skin: Clean, dry, intact\par Inspection: pes planus, moderate ankle swelling, mild swelling lateral foot, no effusion\par Pulses: 2+ DP/PT pulses\par ROM: normal\par Tenderness: No tenderness over the lateral malleolus, no CFL/ATFL/PTFL pain. No medial malleolus pain, no deltoid ligament pain. No proximal fibular pain. No heel pain.\par Stability: Negative anterior drawer, negative posterior drawer.\par Strength: 5/5 TA/GS/EHL 5/5 inversion/eversion\par Neuro: In tact to light touch throughout\par Additional tests: Negative syndesmosis squeeze test. [de-identified] : The following radiographs were ordered and read by me during this patients visit. I reviewed each radiograph in detail with the patient and discussed the findings as highlighted below. \par \par 3 views of the left foot were obtained today, 05/10/2023, that show no acute fracture or dislocation. There is no degenerative change seen. There is flatfoot deformity. No significant other obvious osseous abnormality, otherwise unremarkable.

## 2023-05-19 ENCOUNTER — APPOINTMENT (OUTPATIENT)
Dept: MRI IMAGING | Facility: IMAGING CENTER | Age: 53
End: 2023-05-19
Payer: MEDICAID

## 2023-05-19 ENCOUNTER — OUTPATIENT (OUTPATIENT)
Dept: OUTPATIENT SERVICES | Facility: HOSPITAL | Age: 53
LOS: 1 days | End: 2023-05-19
Payer: MEDICAID

## 2023-05-19 DIAGNOSIS — Z00.00 ENCOUNTER FOR GENERAL ADULT MEDICAL EXAMINATION WITHOUT ABNORMAL FINDINGS: ICD-10-CM

## 2023-05-19 DIAGNOSIS — Z00.8 ENCOUNTER FOR OTHER GENERAL EXAMINATION: ICD-10-CM

## 2023-05-19 PROCEDURE — 73721 MRI JNT OF LWR EXTRE W/O DYE: CPT | Mod: 26,LT

## 2023-05-19 PROCEDURE — 73721 MRI JNT OF LWR EXTRE W/O DYE: CPT

## 2023-05-22 ENCOUNTER — APPOINTMENT (OUTPATIENT)
Dept: ORTHOPEDIC SURGERY | Facility: CLINIC | Age: 53
End: 2023-05-22

## 2023-06-06 ENCOUNTER — APPOINTMENT (OUTPATIENT)
Dept: ORTHOPEDIC SURGERY | Facility: CLINIC | Age: 53
End: 2023-06-06
Payer: MEDICAID

## 2023-06-06 DIAGNOSIS — S83.232D COMPLEX TEAR OF MEDIAL MENISCUS, CURRENT INJURY, LEFT KNEE, SUBSEQUENT ENCOUNTER: ICD-10-CM

## 2023-06-06 PROCEDURE — 99214 OFFICE O/P EST MOD 30 MIN: CPT

## 2023-06-07 PROBLEM — S83.232D COMPLEX TEAR OF MEDIAL MENISCUS OF LEFT KNEE AS CURRENT INJURY, SUBSEQUENT ENCOUNTER: Status: ACTIVE | Noted: 2023-06-07

## 2023-06-07 NOTE — PHYSICAL EXAM
[de-identified] : Oriented to time, place, person\par Mood: Normal\par Affect: Normal\par Appearance: Healthy, well appearing, no acute distress.\par Gait: Normal\par Assistive Devices: None\par \par Left Knee Exam:\par \par Skin: Clean, dry, intact\par Inspection: No obvious malalignment, no masses, no swelling, no effusion\par Pulses: 2+ DP/PT pulses \par ROM: 0-135 degrees of flexion. No pain with deep knee flexion/extension.\par Tenderness: Min MJLT. No LJLT. No pain over the patella facets. No pain to the quadriceps tendon. No pain to the patella tendon. No posterior knee tenderness.\par Stability: Stable to varus, valgus. Negative Lachman testing. Negative anterior drawer, negative posterior drawer.\par Strength: 5/5 Q/H/TA/GS/EHL, without atrophy\par Neuro: Intact to light touch throughout, DTRs normal\par Additional Tests: Negative Shashank's test, Negative patellar grind test  [de-identified] : Images were reviewed from [facility] dated 4/10/23. \par \par Multiple images left knee showed no evidence of bony injury, or michael dislocation. There is no underlying degenerative arthritic change seen. Overall alignment is maintained. Otherwise unremarkable.  \par \par MRI left knee dated  shows 5/19/2023 shows mildly displaced likely near full-thickness oblique radial tear at the posterior horn/body junction of left medial meniscus. Grade 1 subacute on grade 2 chronic sprains of overlying MCL and posteromedial corner. Mild to moderate patellofemoral compartment predominant arthrosis and mild arthrosis of the left knee. No sizable full-thickness chondral defect.  Meniscus tear\par \par We independently reviewed and discussed in detail the images and the radiologic reports with the patient.

## 2023-06-07 NOTE — HISTORY OF PRESENT ILLNESS
[de-identified] : 52 year old male  presents for evaluation of left knee pain which started in April 2023. Denies injury, reports that on 4/8 while standing at work he began experiencing sharp pain in the knee. He was seen by Dr. Palacios  in April at that time he complained of sharp pain over the medial aspect of the left knee worse with any prolonged walking, standing and climbing stairs. He was taking Tylenol for pain. He received steroid injection on 4/10/23 which gave him 75% relief of pain. He has obtained MRI and is here for review of results.  Denies mechanical symptoms of locking, buckling or giving away.

## 2023-06-07 NOTE — ADDENDUM
[FreeTextEntry1] : This note was written by Homa Mclean on 06/06/2023 acting solely as a scribe for Dr. Matthew Morales.\par \par All medical record entries made by the Scribe were at my, Dr. Matthew Morales, direction and personally dictated by me on 06/06/2023. I have personally reviewed the chart and agree that the record accurately reflects my personal performance of the history, physical exam, assessment and plan.

## 2023-06-07 NOTE — DISCUSSION/SUMMARY
[de-identified] : 51 y/o male with left knee MMT\par \par A discussion was had with the patient regarding findings on his left knee.  Patient has early arthrosis on the right knee, and on the left knee shows evidence of maintained joint spaces but with high-grade chondral loss and evidence of a near full-thickness radial tear of the posterior horn medial meniscus.  \par \par Patient reports no mechanical symptoms, with recent improvement of symptoms. Given the age of the patient and imaging that shows early arthrosis, I described that their current condition is likely due to overuse, and exacerbation of age-related "wear and tear". Pain m is related to breakdown of the chondral surfaces and cartilage of the knee consistent with arthrosis.  Discussed consideration of surgical arthroscopy if symptoms persist, however that improved with injection therapy so recommendation is for continued supportive care.  We also discussed complexity in treating degenerative meniscus tears through arthroscopy.\par \par Recommendation: Conservative supportive care & observation; including rest/activity avoidance until less symptomatic with subsequent gradual return to full low impact activity as tolerated.  NSAIDs/ice as needed\par \par Followup as needed

## 2024-09-13 ENCOUNTER — APPOINTMENT (OUTPATIENT)
Dept: PULMONOLOGY | Facility: CLINIC | Age: 54
End: 2024-09-13

## 2024-12-25 PROBLEM — F10.90 ALCOHOL USE: Status: ACTIVE | Noted: 2022-03-08
